# Patient Record
(demographics unavailable — no encounter records)

---

## 2025-02-21 NOTE — PHYSICAL EXAM
[de-identified] : General: No acute distress Respiratory: Nonlabored Cardiovascular: Warm and well-perfused Right index finger  Skin: Gouty tophi over the volar and radial aspect of the DIP joint, no open wounds masses nontender to palpation Motor: AIN PIN M R U motor intact Sensation: No numbness or tingling Can extend fingers fully but cannot flex the DIP joint fully due to the mass Warm and well-perfused

## 2025-02-21 NOTE — PHYSICAL EXAM
[de-identified] : General: No acute distress Respiratory: Nonlabored Cardiovascular: Warm and well-perfused Right index finger  Skin: Gouty tophi over the volar and radial aspect of the DIP joint, no open wounds masses nontender to palpation Motor: AIN PIN M R U motor intact Sensation: No numbness or tingling Can extend fingers fully but cannot flex the DIP joint fully due to the mass Warm and well-perfused

## 2025-02-21 NOTE — HISTORY OF PRESENT ILLNESS
[Right] : right hand dominant [FreeTextEntry1] : Patient presents with a right index finger mass.  He has a history of gout for many years and has a history of poor compliance with his medication.  However over the last year he has been very compliant with his medication regimen.  He says that for several years he has had this gouty mass on the volar side of his right index finger.  It is slowly increased in size and has been stable in size over the last year.  It is painful when he  certain objects and finds it unsightly.  He wishes to have it removed.  He has no numbness or tingling in the hand.  Reports the mass interferes with his ability to fully use his dominant extremity.

## 2025-02-21 NOTE — ASSESSMENT
[FreeTextEntry1] : 53-year-old male with a gouty tophi in the right index finger    -Discussed diagnosis, natural history of condition, and treatment options -Explained that debulking of the mass is indicated when it interferes with his function which it is.  I explained that removing the mass can be associated with wound healing issues in the case of gout as that is one of the main complications.  For this reason we will use nonabsorbable sutures and have to monitor him closely.  I discussed other risks and benefits of surgery such as bleeding infection and nerve injury.  Patient understands and wishes to proceed.  We will plan for mass excision on March 19 with local and sedation. -Also reviewed that he will need to remain diligent with his antigout medications -All questions answered, patient in agreement

## 2025-03-31 NOTE — HISTORY OF PRESENT ILLNESS
[Doing Well] : is doing well [Excellent Pain Control] : has excellent pain control [de-identified] : DOS: 3/19/25 Surgery: right index finger mass excision  [de-identified] : Patient is doing well since surgery.  He has been regular ranging the digits.  He has been washing the incision site and placing Band-Aids.  Pathology was consistent with a gouty tophi [de-identified] : Incision is well-healed with no signs of infection.  The sutures were removed today.  He has full range of motion of the digit without any stiffness.  There is some residual gouty material in the skin but overall the mass has resolved [de-identified] : 3 views of the right index finger were obtained and reviewed in clinic today showing no signs of arthritis in the DIP joint and only mild soft tissue swelling around the volar pulp [de-identified] : No heavy lifting for 2 weeks but continue to range the finger as tolerated.  Warm soapy water to the incision twice a day with Band-Aids.  But no soaking until he is 4 weeks from the surgery.  Can return as needed or if the mass returns.  The patient is committed to sticking to his antigout regimen

## 2025-05-29 NOTE — ASSESSMENT
[FreeTextEntry1] : My impression is this patient has symptomatic cholelithiasis I do need to see his imaging to see what the wall look like and what his CAT scan looks like but he would benefit from a elective cholecystectomy to prevent recurrent biliary symptoms and the possibility of progressing to acute cholecystitis or cholangitis or acute pancreatitis  He does describe that his father had gallstones and ended up with pancreatitis and a lot of complications and he wishes to avoid this  The most confounding factor in decision making with this patient is he is planning on a 2-week trip to [place] at the end of June with his family  [place] it certainly has high fat content food and I think I would be more comfortable if he has his gallbladder taken out prior to that travel as I cannot predict when he will have recurrent symptoms.  The flip side is I cannot predict that he will have 100% perfect outcome from the operation and be able to travel 3 weeks after surgery if we were to do the surgery at the end of next week?  I will review his imaging talk to his referring medical doctor and get back in touch with him tomorrow to discuss what his plans are  He will be back from [place] in mid July which given his recent ER visit and the increasing frequency of his attacks of biliary colic seems kind of far off for me  He is wondering whether or not

## 2025-05-29 NOTE — HISTORY OF PRESENT ILLNESS
[de-identified] : Patient is a very pleasant 53-year-old man who has had at least 4 bouts of severe epigastric pain and dyspepsia after fried foods since January 2025  Most recent was significant in duration and brought him to the emergency room at [facility] at that emergency room they told him he did not have an infection and the ultrasound apparently did not demonstrate any stones however on the CT scan there were stones demonstrated this is unclear and he did not bring any of the paperwork from that visit but he will return tomorrow with the disc so we can review this  Has never had any episodes of jaundice does not drink heavily had no evidence of pancreatitis in the past and he is a big man but not morbidly obese  He has never had any abdominal surgery and is otherwise quite active

## 2025-05-29 NOTE — PLAN
[FreeTextEntry1] : I will review his imaging discussed with his PCP what the next best steps would be and then [place] back with the patient hopefully he will deliver us the imaging tomorrow  I spent 30 minutes with the patient today and will likely spend another 15 minutes reviewing his imaging and coordinating care with both him and his referring provider for a total of 45 minutes of patient care in this episode of care waxing and waning

## 2025-05-29 NOTE — DATA REVIEWED
[FreeTextEntry1] : I will review actual images of CT and ultrasound when he provides the disc from [facility] from his ER visit 10 days ago  Will review his laboratory tests as well previous laboratory test demonstrate a relatively high hemoglobin A1c but in the setting of an active recurrent biliary colic it is not out of the range for consideration of elective semiurgent cholecystectomy

## 2025-05-29 NOTE — PHYSICAL EXAM
[de-identified] : Looks comfortable no distress [de-identified] : No scleral icterus [de-identified] : Abdomen has no scars slight fullness in the right side of the epigastrium but no masses no hernias he does have a large eventration in the midline but no hernias

## 2025-05-29 NOTE — REASON FOR VISIT
[Consultation] : a consultation visit [FreeTextEntry1] : Patient referred by Dr. Garaz for the evaluation of symptomatic cholelithiasis